# Patient Record
Sex: MALE | Race: WHITE
[De-identification: names, ages, dates, MRNs, and addresses within clinical notes are randomized per-mention and may not be internally consistent; named-entity substitution may affect disease eponyms.]

---

## 2018-02-22 ENCOUNTER — HOSPITAL ENCOUNTER (EMERGENCY)
Dept: HOSPITAL 25 - UCCORT | Age: 33
Discharge: HOME | End: 2018-02-22
Payer: COMMERCIAL

## 2018-02-22 VITALS — SYSTOLIC BLOOD PRESSURE: 140 MMHG | DIASTOLIC BLOOD PRESSURE: 82 MMHG

## 2018-02-22 DIAGNOSIS — Z98.52: ICD-10-CM

## 2018-02-22 DIAGNOSIS — I10: ICD-10-CM

## 2018-02-22 DIAGNOSIS — N49.1: Primary | ICD-10-CM

## 2018-02-22 PROCEDURE — G0463 HOSPITAL OUTPT CLINIC VISIT: HCPCS

## 2018-02-22 PROCEDURE — 99202 OFFICE O/P NEW SF 15 MIN: CPT

## 2018-02-22 PROCEDURE — 81003 URINALYSIS AUTO W/O SCOPE: CPT

## 2018-02-22 NOTE — UC
Complaint Male HPI





- HPI Summary


HPI Summary: 





32 male with pain and swelling right hemiscrotum x 2 days


had a vascetomy years ago and since then has had chronic mild pain and 

tenderness here


Aug 2017 had marked pain and swelling in the same area(much worse than currently

) had and ultrasound that showed ?cysts)


no dysuria


no urgency


no penile d/c





- History of Current Complaint


Chief Complaint: UCGeneralIllness


Stated Complaint: PERSONAL


Time Seen by Provider: 02/22/18 15:50


Hx Obtained From: Patient


Onset/Duration: Gradual Onset, Lasting Days


Timing: Constant


Severity Initially: Moderate


Severity Currently: Mild


Pain Intensity: 3


Pain Scale Used: 0-10 Numeric


Location: Scrotum


Character: Constant Pressure


Alleviating Factor(s): Other - elevating scrotum


Associated Signs And Symptoms: Positive: Negative





- Allergies/Home Medications


Allergies/Adverse Reactions: 


 Allergies











Allergy/AdvReac Type Severity Reaction Status Date / Time


 


No Known Allergies Allergy   Verified 02/22/18 15:57











Home Medications: 


 Home Medications





Lisinopril TAB* [Prinivil TAB*] 5 mg PO DAILY 02/22/18 [History Confirmed 02/22/ 18]











PMH/Surg Hx/FS Hx/Imm Hx


Previously Healthy: Yes


Cardiovascular History: Hypertension





- Surgical History


Surgical History: Yes


Surgery Procedure, Year, and Place: Vasectomy 2016





- Family History


Known Family History: Positive: Hypertension





- Social History


Alcohol Use: Occasionally


Substance Use Type: None


Smoking Status (MU): Never Smoked Tobacco





Review of Systems


Constitutional: Negative


Skin: Negative


Eyes: Negative


ENT: Negative


Respiratory: Negative


Cardiovascular: Negative


Gastrointestinal: Negative


Genitourinary: Negative


Motor: Negative


Neurovascular: Negative


Musculoskeletal: Negative


Neurological: Negative


Psychological: Negative


Is Patient Immunocompromised?: No


All Other Systems Reviewed And Are Negative: Yes





Physical Exam


Triage Information Reviewed: Yes


Appearance: Well-Appearing, No Pain Distress, Well-Nourished


Vital Signs: 


 Initial Vital Signs











Temp  99.3 F   02/22/18 15:50


 


Pulse  75   02/22/18 15:50


 


Resp  16   02/22/18 15:50


 


BP  140/82   02/22/18 15:50


 


Pulse Ox  99   02/22/18 15:50











Vital Signs Reviewed: Yes


ENT: Positive: Hearing grossly normal.  Negative: Nasal congestion, Nasal 

drainage, TMs normal, Trismus, Muffled voice, Hoarse voice


Neck: Positive: Supple


Respiratory: Positive: Lungs clear, Normal breath sounds, No respiratory 

distress


Cardiovascular: Positive: RRR, No Murmur


Abdomen Description: Positive: Nontender, No Organomegaly, Other: - testicles 

both descended with normal orientation and lie, penic circumcised and no lesion 

right spermatic cord slight tender and swollen/no overlying redness.  Negative: 

CVA Tenderness (R), CVA Tenderness (L)


Bowel Sounds: Positive: Present


Musculoskeletal: Positive: ROM Intact, No Edema


Neurological: Positive: Alert


Psychological Exam: Normal


Psychological: Positive: Decreased Age Appropriate Behavior





 Complaint Male Course/Dx





- Differential Dx/Diagnosis


Provider Diagnoses: right spermatic cord swelling and tenderness of uncertain 

cause





Discharge





- Discharge Plan


Condition: Stable


Disposition: HOME


Prescriptions: 


DOXYcycline CAP(*) [DOXYcycline 100MG CAP(*)] 100 mg PO BID #14 cap


Referrals: 


Rudolph Newton DO [Primary Care Provider] - 


Additional Instructions: 


I am unsure of the cause of the pain and swelling you are having





I find nothing to suggest epididymitis


your testicular exam was normal


I am not sure if this could be related to post vasectomy scarring/inflammation











If symptoms worsen go to the ER











see your urologist first available appt








advil

## 2018-02-27 ENCOUNTER — HOSPITAL ENCOUNTER (EMERGENCY)
Dept: HOSPITAL 25 - UCCORT | Age: 33
Discharge: HOME | End: 2018-02-27
Payer: COMMERCIAL

## 2018-02-27 VITALS — SYSTOLIC BLOOD PRESSURE: 116 MMHG | DIASTOLIC BLOOD PRESSURE: 85 MMHG

## 2018-02-27 DIAGNOSIS — J10.1: Primary | ICD-10-CM

## 2018-02-27 DIAGNOSIS — I10: ICD-10-CM

## 2018-02-27 PROCEDURE — 99212 OFFICE O/P EST SF 10 MIN: CPT

## 2018-02-27 PROCEDURE — 87502 INFLUENZA DNA AMP PROBE: CPT

## 2018-02-27 PROCEDURE — G0463 HOSPITAL OUTPT CLINIC VISIT: HCPCS

## 2018-02-27 NOTE — UC
FLU HPI





- HPI Summary


HPI Summary: 





32 male presents to ED with complaints of flu-like symptoms that began Sunday, 

2 days ago 2/25/18. States he has had a cough, congestion, slight sore throat, 

fever of 101.5F and body aches. Has been taking coricidin with some relief. 

Took tylenol this morning around 10am. No other complaints. No vomiting. No 

fever today. Co-worker did have positive flu. PMHx significant for HTN. No 

trouble breathing or chest pain





- History of Current Complaint


Chief Complaint: UCRespiratory


Stated Complaint: FLU SYMPTOMS


Time Seen by Provider: 02/27/18 12:06


Hx Obtained From: Patient


Onset/Duration: Sudden Onset, Lasting Days, Still Present


Severity Currently: Moderate


Severity Initially: Moderate


Pain Intensity: 8


Pain Scale Used: 0-10 Numeric


Associated Signs & Symptoms: Positive: Fever, Myalgia, Cough, Sore Throat, 

Nasal Congestion, Headache


Related Hx: Possible Flu/Infectious Exposure





- Allergy/Home Medications


Allergies/Adverse Reactions: 


 Allergies











Allergy/AdvReac Type Severity Reaction Status Date / Time


 


No Known Allergies Allergy   Verified 02/27/18 11:58











Home Medications: 


 Home Medications





Acetaminophen [Pain Reliever] 1,000 mg PO 02/27/18 [History]


Chlorpheniramine/Dextromethorp [Coricidin Hbp Cough & Col] 1 tab PO 02/27/18 [

History]


amLODIPine TAB* [Norvasc 5 mg TAB*] 5 mg PO DAILY 02/27/18 [History Confirmed 02 /27/18]











PMH/Surg Hx/FS Hx/Imm Hx


Cardiovascular History: Hypertension





- Surgical History


Surgical History: Yes


Surgery Procedure, Year, and Place: Vasectomy 2016





- Family History


Known Family History: Positive: Hypertension





- Social History


Alcohol Use: Occasionally


Substance Use Type: None


Smoking Status (MU): Never Smoked Tobacco





Review of Systems


Constitutional: Fever, Chills, Fatigue


ENT: Sore Throat, Ear Ache, Nasal Discharge


Respiratory: Cough


Cardiovascular: Negative


Musculoskeletal: Myalgia


Neurological: Headache


All Other Systems Reviewed And Are Negative: Yes





Physical Exam


Triage Information Reviewed: Yes


Appearance: No Pain Distress, Well-Nourished, Ill-Appearing


Vital Signs: 


 Initial Vital Signs











Temp  97.7 F   02/27/18 11:54


 


Pulse  88   02/27/18 11:54


 


Resp  18   02/27/18 11:54


 


BP  116/85   02/27/18 11:54


 


Pulse Ox  99   02/27/18 11:54











Vital Signs Reviewed: Yes


Eyes: Positive: Conjunctiva Clear


ENT: Positive: Hearing grossly normal, Pharynx normal, Nasal congestion, TMs 

normal, Uvula midline.  Negative: Pharyngeal erythema, Tonsillar swelling, 

Tonsillar exudate


Neck: Positive: Supple


Respiratory: Positive: Chest non-tender, Lungs clear, Normal breath sounds, No 

respiratory distress, No accessory muscle use.  Negative: Respiratory distress, 

Wheezing


Cardiovascular: Positive: RRR, No Murmur, Pulses Normal


Abdominal Exam: Normal


Abdomen Description: Positive: Nontender, Soft


Bowel Sounds: Positive: Present


Musculoskeletal: Positive: Strength Intact


Neurological: Positive: Alert


Skin Exam: Normal





Flu Course/Dx





- Course


Course Of Treatment: influenza obtained and positive. will give tamiflu as 

patients symptoms began within 48 hours. educated on hygiene precautions and 

family member prophylactic treatment. fluids, rest, ibuprofen. any new or 

worsening symptoms please seek medical attention. follow up with pcp.





- Differential Dx/Diagnosis


Differential Diagnosis/HQI/PQRI: Influenza


Provider Diagnoses: influenza B





Discharge





- Discharge Plan


Condition: Good


Disposition: HOME


Prescriptions: 


Oseltamivir SUSP 75 MG dose* [Tamiflu SUSP 75 MG dose*] 75 mg PO BID #10 

oral.[email or web address]


Patient Education Materials:  Influenza (ED)


Forms:  *Work Release


Referrals: 


Rudolph Newton DO [Primary Care Provider] - 


Additional Instructions: 


Take prescribed medication to help with symptoms.


Increase fluids and get plenty of rest.


Wash hands frequently and cover mouth when coughing. Flu is very contagious.


Continue tylenol/ibuprofen as needed for fever/body aches.


Any new or worsening symptoms please seek medical attention promptly.


Follow up with PCP.